# Patient Record
Sex: FEMALE | Race: WHITE | NOT HISPANIC OR LATINO | URBAN - METROPOLITAN AREA
[De-identification: names, ages, dates, MRNs, and addresses within clinical notes are randomized per-mention and may not be internally consistent; named-entity substitution may affect disease eponyms.]

---

## 2017-01-05 ENCOUNTER — APPOINTMENT (OUTPATIENT)
Dept: URBAN - METROPOLITAN AREA CLINIC 217 | Age: 62
Setting detail: DERMATOLOGY
End: 2017-01-05

## 2017-01-05 DIAGNOSIS — L30.8 OTHER SPECIFIED DERMATITIS: ICD-10-CM

## 2017-01-05 PROBLEM — E03.9 HYPOTHYROIDISM, UNSPECIFIED: Status: ACTIVE | Noted: 2017-01-05

## 2017-01-05 PROCEDURE — OTHER COUNSELING: OTHER

## 2017-01-05 PROCEDURE — OTHER TREATMENT REGIMEN: OTHER

## 2017-01-05 PROCEDURE — 99213 OFFICE O/P EST LOW 20 MIN: CPT

## 2017-01-05 PROCEDURE — OTHER PATIENT SPECIFIC COUNSELING: OTHER

## 2017-01-05 NOTE — PROCEDURE: TREATMENT REGIMEN
Otc Regimen: Allegra 180 mg PO daily
Detail Level: Zone
Continue Regimen: Topicort 0.05% gel BID PRN
Plan: Re-discussed with pt and son that underlying cause needs to be addressed for current problem to be resolved. Also encouraged better compliance with topical. Advised behavioral modification to eliminate urge to itch by wearing long sleeve cotton shirts and/or utilizing cling wrap